# Patient Record
Sex: FEMALE | Race: WHITE
[De-identification: names, ages, dates, MRNs, and addresses within clinical notes are randomized per-mention and may not be internally consistent; named-entity substitution may affect disease eponyms.]

---

## 2021-03-16 ENCOUNTER — HOSPITAL ENCOUNTER (EMERGENCY)
Dept: HOSPITAL 8 - ED | Age: 56
Discharge: HOME | End: 2021-03-16
Payer: MEDICARE

## 2021-03-16 VITALS — SYSTOLIC BLOOD PRESSURE: 150 MMHG | DIASTOLIC BLOOD PRESSURE: 111 MMHG

## 2021-03-16 VITALS — BODY MASS INDEX: 25.78 KG/M2 | WEIGHT: 127.87 LBS | HEIGHT: 59 IN

## 2021-03-16 DIAGNOSIS — R04.0: ICD-10-CM

## 2021-03-16 DIAGNOSIS — L03.211: Primary | ICD-10-CM

## 2021-03-16 DIAGNOSIS — F17.210: ICD-10-CM

## 2021-03-16 PROCEDURE — 99406 BEHAV CHNG SMOKING 3-10 MIN: CPT

## 2021-03-16 PROCEDURE — 99283 EMERGENCY DEPT VISIT LOW MDM: CPT

## 2021-03-16 NOTE — NUR
PT BROUGHT BACK TO ROOM FROM TRIAGE. PT COMPLAING OF NOSEBLEEDS FOR 5 DAYS. PT 
CURRENTLY DOES NOT HAVE ANY BLEEDING. PT STATED THAT SHE WENT TO HER PCP AND HE 
PRESCRIBED HER A "COOLING MIST" THAT MADE THE BLEEDING WORSE.